# Patient Record
Sex: FEMALE
[De-identification: names, ages, dates, MRNs, and addresses within clinical notes are randomized per-mention and may not be internally consistent; named-entity substitution may affect disease eponyms.]

---

## 2017-03-16 ENCOUNTER — APPOINTMENT (OUTPATIENT)
Dept: DERMATOLOGY | Facility: CLINIC | Age: 22
End: 2017-03-16

## 2019-08-01 ENCOUNTER — APPOINTMENT (OUTPATIENT)
Dept: PEDIATRICS | Facility: CLINIC | Age: 24
End: 2019-08-01
Payer: COMMERCIAL

## 2019-08-01 VITALS
HEIGHT: 66.5 IN | DIASTOLIC BLOOD PRESSURE: 62 MMHG | HEART RATE: 70 BPM | BODY MASS INDEX: 16.1 KG/M2 | SYSTOLIC BLOOD PRESSURE: 104 MMHG | WEIGHT: 101.4 LBS

## 2019-08-01 DIAGNOSIS — Z00.00 ENCOUNTER FOR GENERAL ADULT MEDICAL EXAMINATION W/OUT ABNORMAL FINDINGS: ICD-10-CM

## 2019-08-01 PROCEDURE — 92551 PURE TONE HEARING TEST AIR: CPT

## 2019-08-01 PROCEDURE — 90471 IMMUNIZATION ADMIN: CPT

## 2019-08-01 PROCEDURE — 90715 TDAP VACCINE 7 YRS/> IM: CPT

## 2019-08-01 PROCEDURE — 99395 PREV VISIT EST AGE 18-39: CPT | Mod: 25

## 2019-08-01 PROCEDURE — 96127 BRIEF EMOTIONAL/BEHAV ASSMT: CPT

## 2019-08-02 NOTE — DISCUSSION/SUMMARY
[Met privately with the adolescent for part of the office visit?] : Met privately with the adolescent for part of the office visit? Yes [] : The components of the vaccine(s) to be administered today are listed in the plan of care. The disease(s) for which the vaccine(s) are intended to prevent and the risks have been discussed with the caretaker.  The risks are also included in the appropriate vaccination information statements which have been provided to the patient's caregiver.  The caregiver has given consent to vaccinate. [Adolescent asks questions during each office  visit and participates in the care plan?] : Adolescent asks questions during each office visit and participates in the care plan? Yes [Adolescent demonstrates understanding of his/her conditions and how to take prescribed medications?] : Adolescent demonstrates understanding of his/her conditions and how to take prescribed medications? Yes [FreeTextEntry1] : discussed Trumenba #2

## 2019-08-02 NOTE — PHYSICAL EXAM
[Alert] : alert [No Acute Distress] : no acute distress [Normocephalic] : normocephalic [EOMI Bilateral] : EOMI bilateral [Clear tympanic membranes with bony landmarks and light reflex present bilaterally] : clear tympanic membranes with bony landmarks and light reflex present bilaterally  [Pink Nasal Mucosa] : pink nasal mucosa [Nonerythematous Oropharynx] : nonerythematous oropharynx [Supple, full passive range of motion] : supple, full passive range of motion [No Palpable Masses] : no palpable masses [Clear to Ausculatation Bilaterally] : clear to auscultation bilaterally [Regular Rate and Rhythm] : regular rate and rhythm [Normal S1, S2 audible] : normal S1, S2 audible [No Murmurs] : no murmurs [+2 Femoral Pulses] : +2 femoral pulses [Soft] : soft [NonTender] : non tender [Non Distended] : non distended [No Hepatomegaly] : no hepatomegaly [Normoactive Bowel Sounds] : normoactive bowel sounds [No Splenomegaly] : no splenomegaly [Leo: _____] : Leo [unfilled] [Leo: ____] : Leo [unfilled] [No Abnormal Lymph Nodes Palpated] : no abnormal lymph nodes palpated [Normal Muscle Tone] : normal muscle tone [No Gait Asymmetry] : no gait asymmetry [No pain or deformities with palpation of bone, muscles, joints] : no pain or deformities with palpation of bone, muscles, joints [No Scoliosis] : no scoliosis [Straight] : straight [+2 Patella DTR] : +2 patella DTR [Cranial Nerves Grossly Intact] : cranial nerves grossly intact [No Rash or Lesions] : no rash or lesions

## 2019-08-02 NOTE — HISTORY OF PRESENT ILLNESS
[Mother] : mother [Yes] : Patient goes to dentist yearly [Normal] : normal [LMP: _____] : LMP: [unfilled] [Grade: ____] : Grade: [unfilled] [Sleep Concerns] : no sleep concerns [Uses electronic nicotine delivery system] : does not use electronic nicotine delivery system [Uses tobacco] : does not use tobacco [Uses drugs] : does not use drugs  [Drinks alcohol] : does not drink alcohol [Uses safety belts/safety equipment] : uses safety belts/safety equipment  [Impaired/distracted driving] : no impaired/distracted driving [No] : Patient has not had sexual intercourse. 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

## 2019-10-15 ENCOUNTER — APPOINTMENT (OUTPATIENT)
Dept: DERMATOLOGY | Facility: CLINIC | Age: 24
End: 2019-10-15
Payer: COMMERCIAL

## 2019-10-15 PROCEDURE — 11900 INJECT SKIN LESIONS </W 7: CPT

## 2019-10-15 PROCEDURE — 10040 EXTRACTION: CPT

## 2019-10-15 PROCEDURE — 99214 OFFICE O/P EST MOD 30 MIN: CPT | Mod: 25

## 2019-11-10 ENCOUNTER — APPOINTMENT (OUTPATIENT)
Dept: PEDIATRICS | Facility: CLINIC | Age: 24
End: 2019-11-10

## 2019-12-17 ENCOUNTER — APPOINTMENT (OUTPATIENT)
Dept: DERMATOLOGY | Facility: CLINIC | Age: 24
End: 2019-12-17
Payer: COMMERCIAL

## 2019-12-17 PROCEDURE — 99214 OFFICE O/P EST MOD 30 MIN: CPT | Mod: 25

## 2019-12-17 PROCEDURE — 11900 INJECT SKIN LESIONS </W 7: CPT

## 2020-03-17 ENCOUNTER — APPOINTMENT (OUTPATIENT)
Dept: DERMATOLOGY | Facility: CLINIC | Age: 25
End: 2020-03-17

## 2020-06-28 ENCOUNTER — APPOINTMENT (OUTPATIENT)
Dept: PEDIATRICS | Facility: CLINIC | Age: 25
End: 2020-06-28
Payer: COMMERCIAL

## 2020-06-28 VITALS — WEIGHT: 97 LBS

## 2020-06-28 DIAGNOSIS — R22.0 LOCALIZED SWELLING, MASS AND LUMP, HEAD: ICD-10-CM

## 2020-06-28 PROCEDURE — 99213 OFFICE O/P EST LOW 20 MIN: CPT

## 2020-06-28 NOTE — REVIEW OF SYSTEMS
[Ear Pain] : no ear pain [Eye Discharge] : no eye discharge [Eye Redness] : no eye redness [Headache] : no headache [Sore Throat] : no sore throat [Nasal Congestion] : no nasal congestion [Nasal Discharge] : no nasal discharge [Negative] : Genitourinary

## 2020-06-28 NOTE — PHYSICAL EXAM
[Normocephalic] : normocephalic [FreeTextEntry2] : very mild swelling right sided but not obscuring jaw line, no erythema, no fluctuance [NL] : normotonic [de-identified] : No gum inflammation, no swelling, no discharge, no bleeding

## 2020-06-28 NOTE — HISTORY OF PRESENT ILLNESS
[FreeTextEntry6] : - All wisdom teeth out at beginning of month\par - Thought had healed well\par - Now having R sided cheek swelling and mild pain\par - Denies redness\par - No bleeding\par - No discharge\par - No fever\par - Did have antibiotics after removal for at least a week, cant remember exactly which or for how long\par - Was not able to contact her oral surgeon today [de-identified] : Patient noticed yesterday that her right cheek was swollen. Patient had wisdom teeth removed on 6/2/2020. Slight discomfort. No fever.

## 2020-06-28 NOTE — DISCUSSION/SUMMARY
[FreeTextEntry1] : - Appears to be healing well with no sign of infection on exam\par - Recommend NSAIDs for swelling and pain relief\par - Should follow up with oral surgeon or dentist, discussed may need dental xrays

## 2024-12-11 ENCOUNTER — APPOINTMENT (OUTPATIENT)
Dept: ORTHOPEDIC SURGERY | Facility: CLINIC | Age: 29
End: 2024-12-11
Payer: COMMERCIAL

## 2024-12-11 VITALS — BODY MASS INDEX: 16.88 KG/M2 | HEIGHT: 66 IN | WEIGHT: 105 LBS

## 2024-12-11 VITALS — HEIGHT: 66 IN | BODY MASS INDEX: 15.59 KG/M2 | WEIGHT: 97 LBS

## 2024-12-11 DIAGNOSIS — Z78.9 OTHER SPECIFIED HEALTH STATUS: ICD-10-CM

## 2024-12-11 DIAGNOSIS — S69.91XA UNSPECIFIED INJURY OF RIGHT WRIST, HAND AND FINGER(S), INITIAL ENCOUNTER: ICD-10-CM

## 2024-12-11 DIAGNOSIS — M79.642 PAIN IN LEFT HAND: ICD-10-CM

## 2024-12-11 DIAGNOSIS — M79.644 PAIN IN RIGHT FINGER(S): ICD-10-CM

## 2024-12-11 DIAGNOSIS — M79.18 MYALGIA, OTHER SITE: ICD-10-CM

## 2024-12-11 PROCEDURE — 73130 X-RAY EXAM OF HAND: CPT | Mod: LT

## 2024-12-11 PROCEDURE — 99204 OFFICE O/P NEW MOD 45 MIN: CPT | Mod: 25

## 2024-12-16 ENCOUNTER — APPOINTMENT (OUTPATIENT)
Dept: MRI IMAGING | Facility: CLINIC | Age: 29
End: 2024-12-16
Payer: COMMERCIAL

## 2024-12-16 PROCEDURE — 73218 MRI UPPER EXTREMITY W/O DYE: CPT | Mod: LT

## 2024-12-27 ENCOUNTER — APPOINTMENT (OUTPATIENT)
Dept: ORTHOPEDIC SURGERY | Facility: CLINIC | Age: 29
End: 2024-12-27
Payer: COMMERCIAL

## 2024-12-27 VITALS — WEIGHT: 105 LBS | BODY MASS INDEX: 16.88 KG/M2 | HEIGHT: 66 IN

## 2024-12-27 DIAGNOSIS — R22.30 LOCALIZED SWELLING, MASS AND LUMP, UNSPECIFIED UPPER LIMB: ICD-10-CM

## 2024-12-27 PROCEDURE — 99202 OFFICE O/P NEW SF 15 MIN: CPT

## 2024-12-27 PROCEDURE — 99212 OFFICE O/P EST SF 10 MIN: CPT

## 2024-12-28 PROBLEM — R22.30 MASS OF FINGER: Status: ACTIVE | Noted: 2024-12-28
